# Patient Record
Sex: FEMALE | Race: WHITE | Employment: UNEMPLOYED | URBAN - METROPOLITAN AREA
[De-identification: names, ages, dates, MRNs, and addresses within clinical notes are randomized per-mention and may not be internally consistent; named-entity substitution may affect disease eponyms.]

---

## 2017-03-23 ENCOUNTER — HOSPITAL ENCOUNTER (OUTPATIENT)
Dept: MAMMOGRAPHY | Age: 52
Discharge: HOME | End: 2017-03-23
Payer: COMMERCIAL

## 2017-03-23 DIAGNOSIS — Z12.31 ENCOUNTER FOR SCREENING MAMMOGRAM FOR BREAST CANCER: ICD-10-CM

## 2017-03-23 PROCEDURE — 77063 BREAST TOMOSYNTHESIS BI: CPT

## 2018-03-15 ENCOUNTER — HOSPITAL ENCOUNTER (OUTPATIENT)
Dept: MAMMOGRAPHY | Age: 53
Discharge: HOME | End: 2018-03-17
Payer: COMMERCIAL

## 2018-03-15 DIAGNOSIS — Z12.31 VISIT FOR SCREENING MAMMOGRAM: ICD-10-CM

## 2018-03-15 PROCEDURE — 77063 BREAST TOMOSYNTHESIS BI: CPT

## 2019-03-26 ENCOUNTER — HOSPITAL ENCOUNTER (OUTPATIENT)
Dept: MAMMOGRAPHY | Age: 54
Discharge: HOME | End: 2019-03-28
Payer: COMMERCIAL

## 2019-03-26 DIAGNOSIS — Z12.31 VISIT FOR SCREENING MAMMOGRAM: ICD-10-CM

## 2019-03-26 PROCEDURE — 77063 BREAST TOMOSYNTHESIS BI: CPT

## 2020-06-04 ENCOUNTER — HOSPITAL ENCOUNTER (OUTPATIENT)
Dept: MAMMOGRAPHY | Age: 55
Discharge: HOME | End: 2020-06-06
Payer: COMMERCIAL

## 2020-06-04 PROCEDURE — 77063 BREAST TOMOSYNTHESIS BI: CPT

## 2021-07-26 ENCOUNTER — HOSPITAL ENCOUNTER (OUTPATIENT)
Dept: MAMMOGRAPHY | Age: 56
Discharge: HOME | End: 2021-07-28
Payer: COMMERCIAL

## 2021-07-26 DIAGNOSIS — Z12.31 ENCOUNTER FOR SCREENING MAMMOGRAM FOR MALIGNANT NEOPLASM OF BREAST: ICD-10-CM

## 2021-07-26 PROCEDURE — 77063 BREAST TOMOSYNTHESIS BI: CPT

## 2022-07-27 ENCOUNTER — HOSPITAL ENCOUNTER (OUTPATIENT)
Dept: MAMMOGRAPHY | Age: 57
Discharge: HOME | End: 2022-07-29
Payer: COMMERCIAL

## 2022-07-27 DIAGNOSIS — Z12.31 VISIT FOR SCREENING MAMMOGRAM: ICD-10-CM

## 2022-07-27 PROCEDURE — 77063 BREAST TOMOSYNTHESIS BI: CPT

## 2023-05-27 ENCOUNTER — HOSPITAL ENCOUNTER (EMERGENCY)
Age: 58
Discharge: HOME | End: 2023-05-27
Attending: EMERGENCY MEDICINE
Payer: COMMERCIAL

## 2023-05-27 ENCOUNTER — APPOINTMENT (OUTPATIENT)
Dept: GENERAL RADIOLOGY | Age: 58
End: 2023-05-27
Payer: COMMERCIAL

## 2023-05-27 VITALS
DIASTOLIC BLOOD PRESSURE: 53 MMHG | SYSTOLIC BLOOD PRESSURE: 118 MMHG | HEIGHT: 63 IN | OXYGEN SATURATION: 100 % | HEART RATE: 65 BPM | WEIGHT: 132 LBS | TEMPERATURE: 98.7 F | RESPIRATION RATE: 16 BRPM | BODY MASS INDEX: 23.39 KG/M2

## 2023-05-27 DIAGNOSIS — S82.045A CLOSED NONDISPLACED COMMINUTED FRACTURE OF LEFT PATELLA, INITIAL ENCOUNTER: Primary | ICD-10-CM

## 2023-05-27 DIAGNOSIS — S51.011A LACERATION OF RIGHT ELBOW, INITIAL ENCOUNTER: ICD-10-CM

## 2023-05-27 PROCEDURE — 73080 X-RAY EXAM OF ELBOW: CPT

## 2023-05-27 PROCEDURE — 99283 EMERGENCY DEPT VISIT LOW MDM: CPT

## 2023-05-27 PROCEDURE — 73562 X-RAY EXAM OF KNEE 3: CPT

## 2023-05-27 PROCEDURE — 73130 X-RAY EXAM OF HAND: CPT

## 2023-05-27 PROCEDURE — 12001 RPR S/N/AX/GEN/TRNK 2.5CM/<: CPT

## 2023-05-27 RX ORDER — ZINC GLUCONATE 50 MG
50 TABLET ORAL DAILY
COMMUNITY

## 2023-05-27 RX ORDER — LANOLIN ALCOHOL/MO/W.PET/CERES
1 CREAM (GRAM) TOPICAL 2 TIMES DAILY
COMMUNITY

## 2023-05-27 RX ORDER — ASCORBIC ACID 100 MG
TABLET,CHEWABLE ORAL
COMMUNITY

## 2023-05-27 RX ORDER — ALENDRONATE SODIUM 35 MG/1
35 TABLET ORAL
COMMUNITY

## 2023-05-27 RX ORDER — PYRAZINAMIDE 500 MG/1
1 TABLET ORAL EVERY 4 HOURS PRN
Qty: 18 TABLET | Refills: 0 | Status: SHIPPED | OUTPATIENT
Start: 2023-05-27 | End: 2023-06-01

## 2023-05-27 RX ORDER — CALCIUM CARBONATE/VITAMIN D3 500 MG-10
TABLET,CHEWABLE ORAL
COMMUNITY

## 2023-05-27 RX ORDER — SODIUM CAPRYLATE
POWDER (GRAM) MISCELLANEOUS
COMMUNITY

## 2023-05-27 ASSESSMENT — PAIN - FUNCTIONAL ASSESSMENT: PAIN_FUNCTIONAL_ASSESSMENT: 0-10

## 2023-05-27 ASSESSMENT — PAIN SCALES - GENERAL: PAINLEVEL_OUTOF10: 6

## 2023-05-27 NOTE — ED NOTES
Patient was walking dog, tripped on sidewalk and fell to the sidewalk, c/o left sided facial pain, left knee pain, left middle finger pain and has an abrasion on right elbow.       Joseph Cruz., RN  [id number]/[id number]

## 2023-05-27 NOTE — ED PROVIDER NOTES
EMERGENCY DEPARTMENT ENCOUNTER    Pt Name: Brunilda Gomez  MRN: [id number]  [place] 1965  Date of evaluation: 5/27/23  CHIEF COMPLAINT       Chief Complaint   Patient presents with    Fall     Walking dog. Missed raised sidewalk and fell    Knee Pain     Left     Finger Pain     Left middle finger     Abrasion     Right elbow     Facial Pain     Right side      HISTORY OF PRESENT ILLNESS   61-year-old female presents emergency room for fall. Patient was walking her dog and tripped. She has an abrasion to her left knee some pain to her left middle finger and an abrasion to her right elbow. She did not hit her head but did scrape her face a little. No loss of consciousness. Patient experience some dizziness after getting up and into the car after the injury. Patient has osteoporosis is very concerned about fracture. REVIEW OF SYSTEMS     Review of Systems   All other systems reviewed and are negative. PASTMEDICAL HISTORY     Past Medical History:   Diagnosis Date    Osteoporosis      Past Problem List  There is no problem list on file for this patient. SURGICAL HISTORY       Past Surgical History:   Procedure Laterality Date    ANTERIOR CRUCIATE LIGAMENT REPAIR       CURRENT MEDICATIONS       Previous Medications    ALENDRONATE (FOSAMAX) 35 MG TABLET    Take 1 tablet by mouth every 7 days    ASCORBIC ACID (VITAMIN C) 100 MG CHEW    Take by mouth    CALCIUM CARB-CHOLECALCIFEROL 500-10 MG-MCG CHEW    Calcium 500 + D (D3)   take one three times a day    CALCIUM CITRATE-VITAMIN D (CITRACAL+D) 315-5 MG-MCG TABS PER TABLET    Take 1 tablet by mouth 2 times daily    SODIUM CAPRYLATE POWD    Take by mouth    ZINC GLUCONATE 50 MG TABLET    Take 1 tablet by mouth daily     ALLERGIES     is allergic to latex. FAMILY HISTORY     has no family status information on file.       SOCIAL HISTORY       Social History     Tobacco Use    Smoking status: Never    Smokeless tobacco: Never   Substance Use Topics

## 2023-05-30 ENCOUNTER — OFFICE VISIT (OUTPATIENT)
Dept: ORTHOPEDIC SURGERY | Age: 58
End: 2023-05-30
Payer: COMMERCIAL

## 2023-05-30 VITALS — RESPIRATION RATE: 16 BRPM | OXYGEN SATURATION: 100 % | BODY MASS INDEX: 23.39 KG/M2 | WEIGHT: 132 LBS | HEIGHT: 63 IN

## 2023-05-30 DIAGNOSIS — S82.045A CLOSED NONDISPLACED COMMINUTED FRACTURE OF LEFT PATELLA, INITIAL ENCOUNTER: Primary | ICD-10-CM

## 2023-05-30 PROCEDURE — 99204 OFFICE O/P NEW MOD 45 MIN: CPT | Performed by: ORTHOPAEDIC SURGERY

## 2023-05-30 RX ORDER — ASPIRIN 325 MG
325 TABLET, DELAYED RELEASE (ENTERIC COATED) ORAL DAILY
Qty: 42 TABLET | Refills: 0 | Status: SHIPPED | OUTPATIENT
Start: 2023-05-30 | End: 2023-07-11

## 2023-05-30 NOTE — PROGRESS NOTES
[address] [place] [address] AND SPORTS MEDICINE  Critical access hospital Amy [facility]ovacs  [address] [place] [address]  Dept: [phone]    Ambulatory Orthopedic Consult      CHIEF COMPLAINT:    Chief Complaint   Patient presents with    Knee Pain     Left        HISTORY OF PRESENT ILLNESS:      The patient is a 59 y.o. female who is being seen for evaluation of the above, which began 5/27/2023 secondary to a fall from a curb  . At today's visit, she is using a knee immobilizer. History is obtained today from:   [x]  the patient     [x]  EMR     []  one family member/friend    []  multiple family members/friends    []  other:          REVIEW OF SYSTEMS:  Musculoskeletal: See HPI for pertinent positives     Past Medical History:    She  has a past medical history of Osteoporosis. Past Surgical History:    She  has a past surgical history that includes Anterior cruciate ligament repair. Current Medications:     Current Outpatient Medications:     Calcium Carb-Cholecalciferol 500-10 MG-MCG CHEW, Calcium 500 + D (D3)  take one three times a day, Disp: , Rfl:     Ascorbic Acid (VITAMIN C) 100 MG CHEW, Take by mouth, Disp: , Rfl:     Sodium Caprylate POWD, Take by mouth, Disp: , Rfl:     zinc gluconate 50 MG tablet, Take 1 tablet by mouth daily, Disp: , Rfl:     alendronate (FOSAMAX) 35 MG tablet, Take 1 tablet by mouth every 7 days, Disp: , Rfl:     calcium citrate-vitamin D (CITRACAL+D) 315-5 MG-MCG TABS per tablet, Take 1 tablet by mouth 2 times daily, Disp: , Rfl:     acetaminophen-codeine (TYLENOL/CODEINE #3) 300-30 MG per tablet, Take 1 tablet by mouth every 4 hours as needed for Pain for up to 5 days. Intended supply: 3 days. Take lowest dose possible to manage pain Max Daily Amount: 6 tablets, Disp: 18 tablet, Rfl: 0     Allergies:    Latex    Family History:  family history is not on file.     Social History:   Social History     Occupational History    Not on

## 2023-06-08 DIAGNOSIS — S82.045A CLOSED NONDISPLACED COMMINUTED FRACTURE OF LEFT PATELLA, INITIAL ENCOUNTER: Primary | ICD-10-CM

## 2023-06-15 ENCOUNTER — OFFICE VISIT (OUTPATIENT)
Dept: ORTHOPEDIC SURGERY | Age: 58
End: 2023-06-15
Payer: COMMERCIAL

## 2023-06-15 VITALS — RESPIRATION RATE: 16 BRPM | WEIGHT: 132 LBS | BODY MASS INDEX: 23.39 KG/M2 | OXYGEN SATURATION: 100 % | HEIGHT: 63 IN

## 2023-06-15 DIAGNOSIS — S82.045D CLOSED NONDISPLACED COMMINUTED FRACTURE OF LEFT PATELLA WITH ROUTINE HEALING, SUBSEQUENT ENCOUNTER: Primary | ICD-10-CM

## 2023-06-15 PROCEDURE — 99212 OFFICE O/P EST SF 10 MIN: CPT | Performed by: ORTHOPAEDIC SURGERY

## 2023-06-20 ENCOUNTER — HOSPITAL ENCOUNTER (OUTPATIENT)
Dept: PHYSICAL THERAPY | Facility: CLINIC | Age: 58
Setting detail: THERAPIES SERIES
Discharge: HOME | End: 2023-06-20
Attending: ORTHOPAEDIC SURGERY
Payer: COMMERCIAL

## 2023-06-20 PROCEDURE — 97530 THERAPEUTIC ACTIVITIES: CPT

## 2023-06-20 PROCEDURE — 97161 PT EVAL LOW COMPLEX 20 MIN: CPT

## 2023-06-20 PROCEDURE — 97110 THERAPEUTIC EXERCISES: CPT

## 2023-06-20 PROCEDURE — 97140 MANUAL THERAPY 1/> REGIONS: CPT

## 2023-06-20 PROCEDURE — 97016 VASOPNEUMATIC DEVICE THERAPY: CPT

## 2023-06-20 NOTE — CONSULTS
[x] [facility]  Outpatient Rehabilitation &  Therapy  [facility]   Jayashree Kleinon: [phone]  F: [phone]      Physical Therapy Lower Extremity Evaluation    Date:  2023  Patient: Naomi Gilford   : 1965  MRN: [id number]  Physician: Dr. Stephens Congo: Zander Herrera after eval)  Medical Diagnosis: Closed nondisplaced comminuted fracture of left patella with routine healing, subsequent encounter (S82.045D [ICD-10-CM])    Rehab Codes: M25.562, M25.662, M79.662, M25.462  Onset date: 23   Next 's appt.: 23    Subjective:   CC/HPI: Patient is a 58 y/o female presenting with L knee pain after a fall while walking her dog on 23. X-rays confirmed a patellar fracture with a possible displacement. Patient is currently in a T ROM knee immobilizer, locking the brace in extension with weight bearing. She is questioning the fit of the  on arrival. Per last note from referring provider, patient to begin 0 to 30 degrees range of motion starting on 23. She reports that pain is overall upder control, does have some pain with stair navigation. PMHx: [] Unremarkable [] Diabetes [] HTN  [] Pacemaker   [] MI/Heart Problems [] Cancer [] Arthritis [] Other:              [x] Refer to full medical chart  In EPIC      Past Medical History          Diagnosis Date Comments     Osteoporosis [M81.0]               Past Surgical History         Laterality Date Comments   Anterior cruciate ligament repair [[id number]]                 Comorbidities:   [x] Obesity [] Dialysis  [] N/A   [] Asthma/COPD [] Dementia [] Other:   [] Stroke [] Sleep apnea [] Other:   [] Vascular disease [] Rheumatic disease [] Other:     Tests:   [x] X-ray:  6/15/2023 FINDINGS:  3 weightbearing views (AP, Lateral, and Sunrise) of the left knee were obtained in the office today and reviewed, revealing transverse patella fracture with questionable interval subtle displacement.      IMPRESSION:

## 2023-06-26 ENCOUNTER — APPOINTMENT (OUTPATIENT)
Dept: PHYSICAL THERAPY | Facility: CLINIC | Age: 58
End: 2023-06-26
Attending: ORTHOPAEDIC SURGERY
Payer: COMMERCIAL

## 2023-06-28 ENCOUNTER — HOSPITAL ENCOUNTER (OUTPATIENT)
Dept: PHYSICAL THERAPY | Facility: CLINIC | Age: 58
Setting detail: THERAPIES SERIES
Discharge: HOME | End: 2023-06-28
Attending: ORTHOPAEDIC SURGERY
Payer: COMMERCIAL

## 2023-06-28 PROCEDURE — 97530 THERAPEUTIC ACTIVITIES: CPT

## 2023-06-28 PROCEDURE — 97110 THERAPEUTIC EXERCISES: CPT

## 2023-06-28 PROCEDURE — 97016 VASOPNEUMATIC DEVICE THERAPY: CPT

## 2023-06-30 ENCOUNTER — HOSPITAL ENCOUNTER (OUTPATIENT)
Dept: PHYSICAL THERAPY | Facility: CLINIC | Age: 58
Setting detail: THERAPIES SERIES
Discharge: HOME | End: 2023-06-30
Attending: ORTHOPAEDIC SURGERY
Payer: COMMERCIAL

## 2023-06-30 PROCEDURE — 97016 VASOPNEUMATIC DEVICE THERAPY: CPT

## 2023-06-30 PROCEDURE — 97110 THERAPEUTIC EXERCISES: CPT

## 2023-07-03 ENCOUNTER — HOSPITAL ENCOUNTER (OUTPATIENT)
Dept: PHYSICAL THERAPY | Facility: CLINIC | Age: 58
Setting detail: THERAPIES SERIES
Discharge: HOME | End: 2023-07-03
Attending: ORTHOPAEDIC SURGERY
Payer: COMMERCIAL

## 2023-07-03 PROCEDURE — 97016 VASOPNEUMATIC DEVICE THERAPY: CPT

## 2023-07-03 PROCEDURE — 97110 THERAPEUTIC EXERCISES: CPT

## 2023-07-03 NOTE — FLOWSHEET NOTE
[x] [facility]  Outpatient Rehabilitation &  Therapy  [address]   [address]   [place]: [phone]  F: [phone]      Physical Therapy Daily Treatment Note    Date:  7/3/2023  Patient Name:  Saranya Del Rio    :  1965  MRN: [id number]  Physician: Dr. Ez Liratle: Zander (20 vs, Andria Patient after 20)  Medical Diagnosis: Closed nondisplaced comminuted fracture of left patella with routine healing, subsequent encounter (S82.045D [ICD-10-CM])                  Rehab Codes: M25.562, M25.662, M79.662, M25.462  Onset date: 23                           Next Dr's appt.: 23  Visit# / total visits:     Cancels/No Shows: 0    Subjective:    Pain:  [x] Yes  [] No Location: LLE knee  Pain Rating: (0-10 scale) 2/10  Pain altered Tx:  [x] No  [] Yes  Action:  Comments: Patient reports today with 2/10 pain in the knee at arrival.     Objective:  Precautions: on 23 begin 0 to 30 degrees range of motion, adding 15 degrees of motion every week as tolerated, with the goal of 90 degrees by 8 weeks. Okay to begin isometric strengthening. She may continue weightbearing as tolerated in the T ROM knee immobilizer, locked in extension. She will sleep in the brace.     Exercises:  Exercise      LLE Patellar fracture   Reps/ Time Weight/ Level Comments              NuStep 10'  L1 0-30 range only              Calf SB Stretch  3x30\"   HEP- long sitting    Hamstring belt Stretch  3x30\"   HEP- long sitting             Quad Set - long sitting  20x5\"   HEP   Quad isometrics at 10 degrees knee extension- manual resistance 20x5\"   Towel roll under knee   Heel Slides  x20   0-30 degrees only    Hamstring Set   10x5\"                Sidelying hip abduction   x20                4-way hip band   x15 [place]  bilat   Band TKE   10x10\" Orange      Balance board   L2x3'                  Other: Stair navigation x1 with use of the left sided railing and cane     Gameready:

## 2023-07-06 ENCOUNTER — HOSPITAL ENCOUNTER (OUTPATIENT)
Dept: PHYSICAL THERAPY | Facility: CLINIC | Age: 58
Setting detail: THERAPIES SERIES
Discharge: HOME | End: 2023-07-06
Attending: ORTHOPAEDIC SURGERY
Payer: COMMERCIAL

## 2023-07-06 PROCEDURE — 97110 THERAPEUTIC EXERCISES: CPT

## 2023-07-06 PROCEDURE — 97140 MANUAL THERAPY 1/> REGIONS: CPT

## 2023-07-06 PROCEDURE — 97016 VASOPNEUMATIC DEVICE THERAPY: CPT

## 2023-07-06 NOTE — FLOWSHEET NOTE
[x] [facility]  Outpatient Rehabilitation &  Therapy  [address]   [address]   [place]: [phone]  F: [phone]      Physical Therapy Daily Treatment Note    Date:  2023  Patient Name:  Dez Garcia    :  1965  MRN: [id number]  Physician: Dr. Townsend Deal: [employer] (20 vs, Richad Delfin after 20)  Medical Diagnosis: Closed nondisplaced comminuted fracture of left patella with routine healing, subsequent encounter (S82.045D [ICD-10-CM])                  Rehab Codes: M25.562, M25.662, M79.662, M25.462  Onset date: 23                           Next 's appt.: 23  Visit# / total visits:     Cancels/No Shows: 0        Subjective:    Pain:  [x] Yes  [] No Location: LLE knee  Pain Rating: (0-10 scale) 0/10  Pain altered Tx:  [x] No  [] Yes  Action:  Comments: Patient reports today with no pain at arrival.       Objective:  Precautions: on 23 begin 0 to 30 degrees range of motion, adding 15 degrees of motion every week as tolerated, with the goal of 90 degrees by 8 weeks. Okay to begin isometric strengthening. She may continue weightbearing as tolerated in the T ROM knee immobilizer, locked in extension. She will sleep in the brace.       Exercise      LLE Patellar fracture   Reps/ Time Weight/ Level Comments              NuStep 10'  L1 0-30 range only              Calf SB Stretch  3x30\"   HEP- long sitting    Hamstring belt Stretch  3x30\"   HEP- long sitting             Quad Set - long sitting  20x5\"   HEP   Quad isometrics at 10 degrees knee extension- manual resistance 20x5\"   Towel roll under knee   Heel Slides  x20   0-30 degrees only    Hamstring Set   10x5\"                Sidelying hip abduction   x20                4-way hip band   x15 Green  bilat   Band TKE   20x10\" Green      Balance board   L2x3'                   Gameready: Vasocompression x15 min to the Bilat knee mod compression      Specific Instructions for next

## 2023-07-13 DIAGNOSIS — S82.045A CLOSED NONDISPLACED COMMINUTED FRACTURE OF LEFT PATELLA, INITIAL ENCOUNTER: Primary | ICD-10-CM

## 2023-07-20 ENCOUNTER — OFFICE VISIT (OUTPATIENT)
Dept: ORTHOPEDIC SURGERY | Age: 58
End: 2023-07-20
Payer: COMMERCIAL

## 2023-07-20 ENCOUNTER — HOSPITAL ENCOUNTER (OUTPATIENT)
Dept: PHYSICAL THERAPY | Facility: CLINIC | Age: 58
Setting detail: THERAPIES SERIES
Discharge: HOME | End: 2023-07-20
Attending: ORTHOPAEDIC SURGERY
Payer: COMMERCIAL

## 2023-07-20 VITALS — RESPIRATION RATE: 16 BRPM | HEIGHT: 63 IN | OXYGEN SATURATION: 100 % | WEIGHT: 132 LBS | BODY MASS INDEX: 23.39 KG/M2

## 2023-07-20 DIAGNOSIS — S82.045D CLOSED NONDISPLACED COMMINUTED FRACTURE OF LEFT PATELLA WITH ROUTINE HEALING, SUBSEQUENT ENCOUNTER: Primary | ICD-10-CM

## 2023-07-20 PROCEDURE — 99212 OFFICE O/P EST SF 10 MIN: CPT | Performed by: ORTHOPAEDIC SURGERY

## 2023-07-20 PROCEDURE — 97016 VASOPNEUMATIC DEVICE THERAPY: CPT

## 2023-07-20 PROCEDURE — 97110 THERAPEUTIC EXERCISES: CPT

## 2023-07-20 NOTE — PROGRESS NOTES
displacement of her fracture, check for extensor lag, and likely continue to progress her activity. At the patient's next visit, depending on how the patient is doing and/or new imaging/labs results, we may consider the following options:    []  Orthotic (OTC)     []  Orthotic (custom)          []  Rocker bottom shoes     []  Brace (OTC)        []  Brace (custom)             []  CAM boot        []  Night splint         []  Heel cups        []  Strap      []  Toe sleeves/splints    []  PT:                     []  Wean out of immobilization   []  Advance activity       []  Topical               []  NSAIDs          []  Lupe         []  Referral:         []  Stress xrays       []  CT         []  MRI        []  Injection:         []  Consider OR      []  Pick OR date    No follow-ups on file. No orders of the defined types were placed in this encounter. No orders of the defined types were placed in this encounter. Bong Garnett MD  Orthopedic Surgery        Please excuse any typos/errors, as this note was created with the assistance of voice recognition software. While intending to generate a document that actually reflects the content of the visit, the document can still have some errors including those of syntax and sound-a-like substitutions which may escape proof reading. In such instances, actual meaning can be extrapolated by context.

## 2023-07-20 NOTE — FLOWSHEET NOTE
risk prevention  []  []  []        []  []  []      Date Addressed:            LTG: To be met in 20 treatments           1. Improve score on assessment tool Lower Extremity Functional Scale (LEFS) from 90% impairment to less than 50% impairment  []  []  []      2. Reduce pain levels to 0/10 or less with ADLs without use of the brace []  []  []      3. Patient to demonstrate normal gait without use of AD or additional knee bracing to return to normal ADL's []  []  []                                  Patient goals: pain relief, walk normal      Pt. Education:  [x] Yes  [x] No  [x] Reviewed Prior HEP/Ed, discussed form   Method of Education: [x] Verbal  [x] Demo  [x] Written    Access Code: [email or web address]  URL: [employer]/  Date: 06/28/2023  Prepared by: Rosemary Ferraro    Exercises  - Long Sitting Ankle Plantar Flexion with Resistance  - 1 x daily - 7 x weekly - 2 sets - 10 reps  - Supine Heel Slide with Strap  - 2 x daily - 7 x weekly - 2 sets - 10 reps     Access Code: [email or web address]  URL: [email or web address]  Date: 06/20/2023  Prepared by: Rosemary Ferraro     Exercises  - Supine Ankle Pumps  - 2-3 x daily - 7 x weekly - 3 sets - 10 reps  - Long Sitting Quad Set  - 2-3 x daily - 7 x weekly - 3 sets - 10 reps - 10 (sec) hold  - Long Sitting Calf Stretch with Strap  - 2-3 x daily - 7 x weekly - 3 sets - 30 (sec) hold  - Seated Table Hamstring Stretch  - 2-3 x daily - 7 x weekly - 3 sets - 30 (sec) hold       Access Code: [email or web address]  URL: [email or web address]  Date: 07/06/2023  Prepared by: Fortino Fierro    Exercises  - Standing Hip Abduction with Anchored Resistance  - 1 x daily - 7 x weekly - 3 sets - 10 reps  - Standing Hip Extension with Anchored Resistance  - 1 x daily - 7 x weekly - 3 sets - 10 reps  - Standing Hip Adduction with Anchored Resistance  - 1 x daily - 7 x weekly - 3 sets - 10 reps  - Standing Repeated Hip Flexion with Resistance  - 1 x daily - 7 x weekly - 3 sets - 10 reps  -

## 2023-07-25 ENCOUNTER — HOSPITAL ENCOUNTER (OUTPATIENT)
Dept: PHYSICAL THERAPY | Facility: CLINIC | Age: 58
Setting detail: THERAPIES SERIES
Discharge: HOME | End: 2023-07-25
Attending: ORTHOPAEDIC SURGERY
Payer: COMMERCIAL

## 2023-07-25 PROCEDURE — 97016 VASOPNEUMATIC DEVICE THERAPY: CPT

## 2023-07-25 PROCEDURE — 97110 THERAPEUTIC EXERCISES: CPT

## 2023-07-25 NOTE — FLOWSHEET NOTE
[x] [facility]  Outpatient Rehabilitation &  Therapy  [address]   [address]   [place]: [phone]  F: [phone]      Physical Therapy Daily Treatment Note    Date:  2023  Patient Name:  Selene Kiser    :  1965  MRN: [id number]  Physician: Dr. Cierra Palmer: Zander (20 vs, [employer] after 20)  Medical Diagnosis: Closed nondisplaced comminuted fracture of left patella with routine healing, subsequent encounter (S82.045D [ICD-10-CM])                  Rehab Codes: M25.562, M25.662, M79.662, M25.462  Onset date: 23                           Next Dr's appt.: 23  Visit# / total visits:     Cancels/No Shows: 0      Subjective:    Pain:  [x] Yes  [] No Location: LLE knee  Pain Rating: (0-10 scale) 0/10  Pain altered Tx:  [x] No  [] Yes  Action:  Comments: Patient has been sleeping with the brace intermittently, afraid she is going to wake up in the middle of the night and accidentally walk on her leg without the brace.        Objective:  Precautions: L knee ROM as tolerated, WBAT with T ROM brace donned   Exercise      LLE Patellar fracture    Onset 23 Reps/ Time Weight/ Level Comments              NuStep 5'  L2 0-90 range only             Calf SB Stretch  3x30\"      Hamstring belt Stretch  3x30\"               Quad Set - long sitting  20x10\"   HEP   LAQ x10      Heel Slides x20   0-90 degrees only    Hamstring Set                  Sidelying hip abduction   x20 Orange     Clamshells  x20 [place]           4-way hip band   x20 Green  Bilat, knee unlocked    Band TKE   10x10\" Green      Balance board         Total Gym Squat L15x20  ROM as tolerated    Heel raises off edge of step  x20       Gait training without AD 2x10 feet discussed heel to toe transition with knee flexion throughout swing phase as tolerated     Gameready: Vasocompression x10 min to the Bilat knee mod compression      Specific Instructions for next treatment:

## 2023-07-27 ENCOUNTER — HOSPITAL ENCOUNTER (OUTPATIENT)
Dept: PHYSICAL THERAPY | Facility: CLINIC | Age: 58
Setting detail: THERAPIES SERIES
Discharge: HOME | End: 2023-07-27
Attending: ORTHOPAEDIC SURGERY
Payer: COMMERCIAL

## 2023-07-27 PROCEDURE — 97110 THERAPEUTIC EXERCISES: CPT

## 2023-07-27 PROCEDURE — 97016 VASOPNEUMATIC DEVICE THERAPY: CPT

## 2023-07-27 NOTE — FLOWSHEET NOTE
Education:  [x] Verbalizes understanding. [x] Demonstrates understanding. [x] Needs review. [] Demonstrates/verbalizes HEP/Ed previously given. Plan: [x] Continue current frequency toward long and short term goals.     [x] Specific Instructions for subsequent treatments: see above         Time In: 1500             Time Out: 0270      Electronically signed by:  Steve Miles PT

## 2023-08-01 ENCOUNTER — HOSPITAL ENCOUNTER (OUTPATIENT)
Dept: PHYSICAL THERAPY | Facility: CLINIC | Age: 58
Setting detail: THERAPIES SERIES
Discharge: HOME | End: 2023-08-01
Attending: ORTHOPAEDIC SURGERY
Payer: COMMERCIAL

## 2023-08-01 PROCEDURE — 97016 VASOPNEUMATIC DEVICE THERAPY: CPT

## 2023-08-01 PROCEDURE — 97110 THERAPEUTIC EXERCISES: CPT

## 2023-08-01 NOTE — FLOWSHEET NOTE
from 90% impairment to less than 50% impairment  []  []  []      2. Reduce pain levels to 0/10 or less with ADLs without use of the brace []  []  []      3. Patient to demonstrate normal gait without use of AD or additional knee bracing to return to normal ADL's []  []  []                                  Patient goals: pain relief, walk normal      Pt. Education:  [x] Yes  [x] No  [x] Reviewed Prior HEP/Ed, discussed form and new exercises   Method of Education: [x] Verbal  [x] Demo  [] Written    Access Code: [email or web address]  URL: [email or web address]  Date: 08/01/2023  Prepared by: Stephanie Gillespie    Exercises  - Supine Bridge  - 1 x daily - 7 x weekly - 3 sets - 10 reps  - Prone Knee Flexion  - 1 x daily - 7 x weekly - 3 sets - 10 reps      Access Code: [email or web address]  URL: [employer]/  Date: 07/25/2023  Prepared by: Stephanie Gillespie    Exercises  - Clamshell with Resistance  - 1 x daily - 7 x weekly - 2 sets - 10 reps  - Sidelying Hip Abduction with Resistance at Thighs  - 1 x daily - 7 x weekly - 2 sets - 10 reps  - Seated Long Arc Quad  - 1 x daily - 7 x weekly - 3 sets - 10 reps  - Supine Active Straight Leg Raise  - 1 x daily - 7 x weekly - 2 sets - 10 reps      Access Code: [email or web address]  URL: [employer]/  Date: 06/28/2023  Prepared by: Stephanie Gillespie    Exercises  - Long Sitting Ankle Plantar Flexion with Resistance  - 1 x daily - 7 x weekly - 2 sets - 10 reps  - Supine Heel Slide with Strap  - 2 x daily - 7 x weekly - 2 sets - 10 reps     Access Code: [email or web address]  URL: [employer]/  Date: 06/20/2023  Prepared by: Stephanie Grills     Exercises  - Supine Ankle Pumps  - 2-3 x daily - 7 x weekly - 3 sets - 10 reps  - Long Sitting Quad Set  - 2-3 x daily - 7 x weekly - 3 sets - 10 reps - 10 (sec) hold  - Long Sitting Calf Stretch with Strap  - 2-3 x daily - 7 x weekly - 3 sets - 30 (sec) hold  - Seated Table Hamstring Stretch  - 2-3 x daily - 7 x weekly - 3

## 2023-08-03 ENCOUNTER — HOSPITAL ENCOUNTER (OUTPATIENT)
Dept: PHYSICAL THERAPY | Facility: CLINIC | Age: 58
Setting detail: THERAPIES SERIES
Discharge: HOME | End: 2023-08-03
Attending: ORTHOPAEDIC SURGERY
Payer: COMMERCIAL

## 2023-08-03 PROCEDURE — 97016 VASOPNEUMATIC DEVICE THERAPY: CPT

## 2023-08-03 PROCEDURE — 97110 THERAPEUTIC EXERCISES: CPT

## 2023-08-03 NOTE — FLOWSHEET NOTE
[x] [facility]  Outpatient Rehabilitation &  Therapy  [address]   [address]   [place]: [phone]  F: [phone]      Physical Therapy Daily Treatment Note    Date:  8/3/2023  Patient Name:  Kobe Menezes    :  1965  MRN: [id number]  Physician: Dr. Schmidt Captain: Zander (20 vs, Jaspreet Jay after 20)  Medical Diagnosis: Closed nondisplaced comminuted fracture of left patella with routine healing, subsequent encounter (S82.045D [ICD-10-CM])                  Rehab Codes: M25.562, M25.662, M79.662, M25.462  Onset date: 23                           Next 's appt.: 23  Visit# / total visits: 10/20    Cancels/No Shows: 0      Subjective:    Pain:  [x] Yes  [] No Location: LLE knee  Pain Rating: (0-10 scale) 0/10  Pain altered Tx:  [x] No  [] Yes  Action:  Comments: Patient arrived noting continued improvements in function.        Objective:  Precautions: LLE knee ROM as tolerated, WBAT with T ROM brace donned , begin to wean out of brace 23  Exercise      LLE Patellar fracture    Onset 23 Reps/ Time Weight/ Level Comments Completed 23                NuStep 5'  L2 0-90 range only     Bike  5'   Grey bike, lowest level   Charged while obtaining hx and for ROM  x   Calf SB Stretch  3x30\"    x   Hamstring Stretch at step 3x30\"    x             LAQ x10    x   Heel Slides x20   Within tolerance  x   SLR  x20     x             Sidelying hip abduction   x20 Blue    x   Clamshells  x20 Blue    x   Prone knee flexion   x20 0# Added 23 x   Step down   2x10 6\" Increased step 23 x   Step up   2x10 6\"  x   Heel taps  2x10 2\"     4-way hip band   x15 Blue   Bilat, knee unlocked   Progressed resis 23 x   Band TKE   10x10\" Green       Balance board   L2x5'     x   Total Gym Squat  L20x20  ROM as tolerated  x   Total Gym Heel Raises   L20x20   x   Heel raises off edge of step   x20        Stool scoots    Next       Gameready:

## 2023-08-08 ENCOUNTER — HOSPITAL ENCOUNTER (OUTPATIENT)
Dept: PHYSICAL THERAPY | Facility: CLINIC | Age: 58
Setting detail: THERAPIES SERIES
Discharge: HOME | End: 2023-08-08
Attending: ORTHOPAEDIC SURGERY
Payer: COMMERCIAL

## 2023-08-08 PROCEDURE — 97110 THERAPEUTIC EXERCISES: CPT

## 2023-08-08 NOTE — PROGRESS NOTES
[x] [facility]  Outpatient Rehabilitation &  Therapy  [address]   [address]   [place]: [phone]  F: [phone]      Physical Therapy Daily Treatment Note    Date:  2023  Patient Name:  Rose Churchill    :  1965  MRN: [id number]  Physician: Dr. Pauline Fleming: Zander (20 vs, Karlos Diaz after 20)  Medical Diagnosis: Closed nondisplaced comminuted fracture of left patella with routine healing, subsequent encounter (S82.045D [ICD-10-CM])                  Rehab Codes: M25.562, M25.662, M79.662, M25.462  Onset date: 23                           Next Dr's appt.: 23  Visit# / total visits:     Cancels/No Shows: 0      Subjective:    Pain:  [x] Yes  [] No Location: LLE knee  Pain Rating: (0-10 scale) 0/10  Pain altered Tx:  [x] No  [] Yes  Action:  Comments: Patient arrived noting continued improvements in function. She is improving her ability to complete steps at home. Has had a few instances of knee instability with gait when wearing her brace, no pain associated.        Objective:  Precautions: LLE knee ROM as tolerated, WBAT with T ROM brace donned , begin to wean out of brace 23  Exercise      LLE Patellar fracture    Onset 23 Reps/ Time Weight/ Level Comments Completed 23                NuStep 5'  L2 0-90 range only     Bike  5'   Grey bike, lowest level   Charged 7 min while obtaining history  x   Calf SB Stretch  3x30\"    x   Hamstring Stretch at step 3x30\"    x             LAQ x10       Heel Slides x20   Within tolerance     SLR  x20                  Sidelying hip abduction   x20 Blue       Clamshells  x20 Blue       Prone knee flexion   x20 0# Added 23 x   Prone hip extension knee straight  x20  Added 23 x   Step down   2x10 6\" Increased step 23 x   Step up   2x10 6\"  x   Heel taps  x10 2\"  x   4-way hip band   x15 Blue   Bilat, knee unlocked   Progressed resis 23 x   Band TKE   10x10\" [facility]

## 2023-08-10 ENCOUNTER — HOSPITAL ENCOUNTER (OUTPATIENT)
Dept: PHYSICAL THERAPY | Facility: CLINIC | Age: 58
Setting detail: THERAPIES SERIES
Discharge: HOME | End: 2023-08-10
Attending: ORTHOPAEDIC SURGERY
Payer: COMMERCIAL

## 2023-08-10 PROCEDURE — 97110 THERAPEUTIC EXERCISES: CPT

## 2023-08-10 NOTE — PROGRESS NOTES
[x] [facility]  Outpatient Rehabilitation &  Therapy  [address]   [address]   [place]: [phone]  F: [phone]      Physical Therapy Daily Treatment and Progress Note    Date:  8/10/2023  Patient Name:  Yu Tom    :  1965  MRN: [id number]  Physician: Dr. Carlos Alberto Molina: Zander (20 vs, Jesi Finders after 20)  Medical Diagnosis: Closed nondisplaced comminuted fracture of left patella with routine healing, subsequent encounter (S82.045D [ICD-10-CM])                  Rehab Codes: M25.562, M25.662, M79.662, M25.462  Onset date: 23                           Next Dr's appt.: 23  Visit# / total visits:     Cancels/No Shows: 0      Subjective:    Pain:  [x] Yes  [] No Location: LLE knee  Pain Rating: (0-10 scale) 0/10  Pain altered Tx:  [x] No  [] Yes  Action:  Comments: Patient arrived noting continued improvements in function. She is improving her ability to complete steps at home. Has had a few instances of knee instability with gait when wearing her brace, no pain associated.        Objective:  Precautions: LLE knee ROM as tolerated, WBAT with T ROM brace donned , begin to wean out of brace 23  Exercise      LLE Patellar fracture    Onset 23 Reps/ Time Weight/ Level Comments                   Bike  10'   Grey bike, lowest level   Charged 7 min while obtaining history          Calf SB Stretch  3x30\"      Hamstring Stretch at step 3x30\"      Prone hip ext x20       Prone knee flexion  x20 0#    Prone knee hang 2' 2# weight      Step down   2x10 6\"    Step up   2x10 6\"    Heel taps  2x10 4\"    4-way hip band   x15 Blue      Band TKE   10x10\" Green     Balance board   L2x5'      Squats to mat table   x20     Rebounder   x20     Heel raises off edge of step   x20      Stool scoots   x2   10#    Total Gym squats  x20 L20    Total Gym heel raises   x20 L20        Specific Instructions for next treatment: progress strength,

## 2023-08-15 ENCOUNTER — HOSPITAL ENCOUNTER (OUTPATIENT)
Dept: MAMMOGRAPHY | Age: 58
Discharge: HOME | End: 2023-08-17
Payer: COMMERCIAL

## 2023-08-15 ENCOUNTER — HOSPITAL ENCOUNTER (OUTPATIENT)
Dept: PHYSICAL THERAPY | Facility: CLINIC | Age: 58
Setting detail: THERAPIES SERIES
Discharge: HOME | End: 2023-08-15
Attending: ORTHOPAEDIC SURGERY
Payer: COMMERCIAL

## 2023-08-15 DIAGNOSIS — Z12.31 VISIT FOR SCREENING MAMMOGRAM: ICD-10-CM

## 2023-08-15 PROCEDURE — 97110 THERAPEUTIC EXERCISES: CPT

## 2023-08-15 PROCEDURE — 77063 BREAST TOMOSYNTHESIS BI: CPT

## 2023-08-15 NOTE — FLOWSHEET NOTE
[x] [facility]  Outpatient Rehabilitation &  Therapy  [address]   [address]   [place]: [phone]  F: [phone]      Physical Therapy Daily Treatment and Progress Note    Date:  8/15/2023  Patient Name:  Kobe Menezes    :  1965  MRN: [id number]  Physician: Dr. Schmidt Captain: Yazmin Wheat (8 visits approved from 23 to 23)  Medical Diagnosis: Closed nondisplaced comminuted fracture of left patella with routine healing, subsequent encounter (S82.045D [ICD-10-CM])                  Rehab Codes: M25.562, M25.662, M79.662, M25.462  Onset date: 23                           Next 's appt.: 23  Visit# / total visits:      Cancels/No Shows: 0      Subjective:    Pain:  [x] Yes  [] No Location: LLE knee  Pain Rating: (0-10 scale) 0/10  Pain altered Tx:  [x] No  [] Yes  Action:  Comments: Patient arrives with brace donned. No complaints of pain.        Objective:  Precautions: LLE knee ROM as tolerated, WBAT with T ROM brace donned , begin to wean out of brace 23  Exercise      LLE Patellar fracture    Onset 23 Reps/ Time Weight/ Level Comments                   Bike  10'   Grey bike, lowest level   Charged 3 min while obtaining history          Calf SB Stretch  3x30\"      Hamstring Stretch at step 3x30\"      Clamshells x25 Lime     Hip abduction  x20 Lime    Prone hip ext 2x10    Bend in table    Prone knee flexion  x20 0#    Prone knee hang 2' 2# weight      Prone quadriceps stretch  3x30\"     Step down   6\"    Step up   6\"    Heel taps  4\"    4-way hip band   x15 Blue      Band TKE       Balance board  5' L2    Squats to mat table   x20     Rebounder   x20     Heel raises off edge of step   NT       Stool scoots   x2   10#    Total Gym squats  NT L20    Monster Walks   1x20 ft Lime Band at knees  Forward and lateral today              Specific Instructions for next treatment: progress strength, begin single leg

## 2023-08-17 ENCOUNTER — APPOINTMENT (OUTPATIENT)
Dept: PHYSICAL THERAPY | Facility: CLINIC | Age: 58
End: 2023-08-17
Attending: ORTHOPAEDIC SURGERY
Payer: COMMERCIAL

## 2023-08-17 ENCOUNTER — HOSPITAL ENCOUNTER (OUTPATIENT)
Dept: PHYSICAL THERAPY | Facility: CLINIC | Age: 58
Setting detail: THERAPIES SERIES
Discharge: HOME | End: 2023-08-17
Attending: ORTHOPAEDIC SURGERY
Payer: COMMERCIAL

## 2023-08-17 PROCEDURE — 97110 THERAPEUTIC EXERCISES: CPT

## 2023-08-17 NOTE — FLOWSHEET NOTE
Treatment Charges: Mins Units   []  Modalities     [x]  Ther Exercise 40 3   []  Manual Therapy     []  Ther Activities     []  Neuro Re-ed     []  Vasocompression     [] Gait     []  Other     Total Treatment time 40 3       Assessment: [x] Progressing toward goals. Performed all exercises with brace doffed. Patient noted muscle fatigue and soreness throughout with no complaint of pain or instability. Reviewed prone program this date and issued handouts and reviewed weaning out of brace. Will plan to monitor response to progressions and continue to advance strength program per tolerance. [] No change. [] Other:  [x] Patient would continue to benefit from skilled physical therapy services in order to: : improve L knee ROM and strength, improve obvious edema, improve gait mechanics without an AD, and decrease pain to prepare her for her typical ADL's       Goals  MET NOT MET ON-  GOING  Details   Date Addressed: 8/8/23           STG: To be met in 10 treatments            1. ? Pain: Decrease L knee pain levels to 2/10 with ADLs []  [x]  []  3/10 pain at highest     2. ? ROM: Increase L knee AROM to at least 0-90 deg to reduce difficulty with ADL's [x]  []  []   0-122 degrees    3. ? Strength: Increase MMT to 5/5 throughout to ease functional limitations and mobility  []  [x]  []  Continued hip weakness, especially gluteals bilaterally   4. Patient to demonstrate a set of 20 SLR without quad lag suggesting improve quadriceps strength required for ADL's  [x]   []   []      5. Independent with Home Exercise Programs [x]  []  []      6. Demonstrate knowledge of fall risk prevention  []  []  []       []  []  []      Date Addressed: 8/8/23           LTG: To be met in 20 treatments           1.  Improve score on assessment tool Lower Extremity Functional Scale (LEFS) from 90% impairment to less than 50% impairment  [x]  []  []  38% impairment on Lower Extremity Functional Scale (LEFS)    2. Reduce pain levels to

## 2023-08-21 ENCOUNTER — HOSPITAL ENCOUNTER (OUTPATIENT)
Dept: PHYSICAL THERAPY | Facility: CLINIC | Age: 58
Setting detail: THERAPIES SERIES
Discharge: HOME | End: 2023-08-21
Attending: ORTHOPAEDIC SURGERY
Payer: COMMERCIAL

## 2023-08-21 PROCEDURE — 97110 THERAPEUTIC EXERCISES: CPT

## 2023-08-21 NOTE — FLOWSHEET NOTE
[x] [facility]  Outpatient Rehabilitation &  Therapy  [address]   [address]   [place]: [phone]  F: [phone]      Physical Therapy Daily Treatment and Progress Note    Date:  2023  Patient Name:  Patrick Levine    :  1965  MRN: [id number]  Physician: Dr. Judah Bermeo: [employer] (8 visits approved from 23 to 23)  Medical Diagnosis: Closed nondisplaced comminuted fracture of left patella with routine healing, subsequent encounter (S82.045D [ICD-10-CM])                  Rehab Codes: M25.562, M25.662, M79.662, M25.462  Onset date: 23                           Next 's appt.: 23  Visit# / total visits: 15/28     Cancels/No Shows: 0      Subjective:    Pain:  [x] Yes  [] No Location: LLE knee  Pain Rating: (0-10 scale) 0/10  Pain altered Tx:  [x] No  [] Yes  Action:  Comments: Patient arrived today with no complaints of pain.        Objective:  Precautions: LLE knee ROM as tolerated, WBAT with T ROM brace donned , begin to wean out of brace 23  Exercise      LLE Patellar fracture    Onset 23 Reps/ Time Weight/ Level Comments                   Bike  10'   Grey bike, lowest level          Calf SB Stretch  3x30\"      Hamstring Stretch at step 3x30\"      Prone hang             Lunges   2x10     Step down   2x10 6\"    Step up   2x10 6\"    Heel taps  2x10 4\"    4-way hip band   Tracky   5' L2    Squats to mat table   x20     Rebounder   x20  Red ball     Stool scoots   x2   10#    Total Gym squats  x20 L20    Monster Walks   x10 Forward and lateral steps    Cones x3 Bilat         Specific Instructions for next treatment: progress strength, begin single leg stability       Treatment Charges: Mins Units   []  Modalities     [x]  Ther Exercise 45 3   []  Manual Therapy     []  Ther Activities     []  Neuro Re-ed     []  Vasocompression     [] Gait     []  Other     Total Treatment time 45 3

## 2023-08-23 ENCOUNTER — HOSPITAL ENCOUNTER (OUTPATIENT)
Dept: PHYSICAL THERAPY | Facility: CLINIC | Age: 58
Setting detail: THERAPIES SERIES
Discharge: HOME | End: 2023-08-23
Attending: ORTHOPAEDIC SURGERY
Payer: COMMERCIAL

## 2023-08-23 PROCEDURE — 97110 THERAPEUTIC EXERCISES: CPT

## 2023-08-23 NOTE — FLOWSHEET NOTE
[x] [facility]  Outpatient Rehabilitation &  Therapy  [address]   [address]   [place]: [phone]  F: [phone]      Physical Therapy Daily Treatment and Progress Note    Date:  2023  Patient Name:  Selene Kiser    :  1965  MRN: [id number]  Physician: Dr. Cierra Palmer: Mesfin Rader (8 visits approved from 23 to 23)  Medical Diagnosis: Closed nondisplaced comminuted fracture of left patella with routine healing, subsequent encounter (S82.045D [ICD-10-CM])                  Rehab Codes: M25.562, M25.662, M79.662, M25.462  Onset date: 23                           Next 's appt.: 23  Visit# / total visits:      Cancels/No Shows: 0      Subjective:    Pain:  [x] Yes  [] No Location: LLE knee  Pain Rating: (0-10 scale) 0/10  Pain altered Tx:  [x] No  [] Yes  Action:  Comments: Patient arrived today with reports of hip soreness on the left following last session, minimal knee pain.        Objective:  Precautions: LLE WBAT   Exercise      LLE Patellar fracture    Onset 23 Reps/ Time Weight/ Level Comments                   Bike  10'   Grey bike, lowest level          Calf SB Stretch  3x30\"      Hamstring Stretch at step 3x30\"      Prone hang             Lunges   2x10     Step down    6\"    Step up   2x10 6\"    Heel taps  2x10 6\"    4-way hip band   [employer]   5' L2    Squats to mat table   x20     Rebounder 3-way   x20  Red ball  Added 3-way     Stool scoots     10#    Total Gym single leg squats 2x10 L20 Added    Monster Walks   x10 Forward and lateral steps    Cones x2 Bilat         Specific Instructions for next treatment: progress strength, begin single leg stability       Treatment Charges: Mins Units   []  Modalities     [x]  Ther Exercise 45 3   []  Manual Therapy     []  Ther Activities     []  Neuro Re-ed     []  Vasocompression     [] Gait     []  Other     Total Treatment time

## 2023-08-29 ENCOUNTER — HOSPITAL ENCOUNTER (OUTPATIENT)
Dept: PHYSICAL THERAPY | Facility: CLINIC | Age: 58
Setting detail: THERAPIES SERIES
Discharge: HOME | End: 2023-08-29
Attending: ORTHOPAEDIC SURGERY
Payer: COMMERCIAL

## 2023-08-29 PROCEDURE — 97110 THERAPEUTIC EXERCISES: CPT

## 2023-08-29 NOTE — FLOWSHEET NOTE
[x] [facility]  Outpatient Rehabilitation &  Therapy  [address]   [address]   [place]: [phone]  F: [phone]      Physical Therapy Daily Treatment and Progress Note    Date:  2023  Patient Name:  Noelle Castaneda    :  1965  MRN: [id number]  Physician: Dr. Luis Shaw: Petey Clark (8 visits approved from 23 to 23)  Medical Diagnosis: Closed nondisplaced comminuted fracture of left patella with routine healing, subsequent encounter (S82.045D [ICD-10-CM])                  Rehab Codes: M25.562, M25.662, M79.662, M25.462  Onset date: 23                           Next 's appt.: 23  Visit# / total visits:      Cancels/No Shows: 0      Subjective:    Pain:  [x] Yes  [] No Location: LLE knee  Pain Rating: (0-10 scale) 0/10  Pain altered Tx:  [x] No  [] Yes  Action:  Comments: Patient arrives without complaints. She had some pain to her anterior knee for a few days, it has since resolved. Has been walking 1-2 miles at a time around her neighborhood. She always walks with her  or daughter due to discomfort and some fear.      Objective:  Precautions: LLE WBAT   Exercise      LLE Patellar fracture    Onset 23 Reps/ Time Weight/ Level Comments                   Bike  10'   Grey bike, lowest level          Calf SB Stretch  3x30\"      Hamstring Stretch at step 3x30\"      Prone hang             Lunges   x10     Step down    6\"    Step up with march   2x10 6\" with foam    Heel taps  x12 6\"    4-way hip band   x15 Blue      Balance board   5' L2    TRX Squats  2x10     Rebounder 3-way   x20  Red ball  Added 3-way     Stool scoots   1x50ft  10#    Total Gym single leg squats 2x10 L20 Added    Monster Walks   X10  Forward and lateral steps Utilized band this date   Cones x2 Bilat         Specific Instructions for next treatment: elliptical warm up?, progress strength, begin single leg stability

## 2023-08-31 ENCOUNTER — HOSPITAL ENCOUNTER (OUTPATIENT)
Dept: PHYSICAL THERAPY | Facility: CLINIC | Age: 58
Setting detail: THERAPIES SERIES
Discharge: HOME | End: 2023-08-31
Attending: ORTHOPAEDIC SURGERY
Payer: COMMERCIAL

## 2023-08-31 PROCEDURE — 97110 THERAPEUTIC EXERCISES: CPT

## 2023-08-31 NOTE — FLOWSHEET NOTE
without use of the brace []  [x]  []      3. Patient to demonstrate normal gait without use of AD or additional knee bracing to return to normal ADL's []  [x]  []  Continues to use the knee brace per protocol, to remove for ambulation starting on 8/17/23                                Patient goals: pain relief, walk normal      Pt. Education:  [x] Yes  [x] No  [x] Reviewed Prior HEP/Ed, discussed form and proper technique   Method of Education: [x] Verbal  [x] Demo  [x] Written:  Access Code: [email or web address]  URL: [email or web address]  Date: 08/17/2023  Prepared by: Magde Priyank    Exercises  - Prone Hip Extension with Bent Knee  - 1 x daily - 7 x weekly - 2 sets - 10 reps  - Prone Knee Flexion AROM  - 1 x daily - 7 x weekly - 2 sets - 10 reps  - Prone Quadriceps Stretch with Strap  - 1 x daily - 7 x weekly - 3 sets - 10 reps - 30 hold    Comprehension of Education:  [x] Verbalizes understanding. [x] Demonstrates understanding. [x] Needs review. [] Demonstrates/verbalizes HEP/Ed previously given. Plan: [x] Continue current frequency toward long and short term goals.     [x] Specific Instructions for subsequent treatments: see above        Time In: 1350        Time Out: [facility]      Electronically signed by:  Yusef King, PT

## 2023-09-07 ENCOUNTER — HOSPITAL ENCOUNTER (OUTPATIENT)
Dept: PHYSICAL THERAPY | Facility: CLINIC | Age: 58
Setting detail: THERAPIES SERIES
Discharge: HOME | End: 2023-09-07
Attending: ORTHOPAEDIC SURGERY
Payer: COMMERCIAL

## 2023-09-07 PROCEDURE — 97110 THERAPEUTIC EXERCISES: CPT

## 2023-09-07 NOTE — FLOWSHEET NOTE
Patient goals: pain relief, walk normal      Pt. Education:  [x] Yes  [x] No  [x] Reviewed Prior HEP/Ed, discussed form and proper technique   Method of Education: [x] Verbal  [x] Demo  [x] Written:  Access Code: [email or web address]  URL: [email or web address]  Date: 08/17/2023  Prepared by: Maged Yost    Exercises  - Prone Hip Extension with Bent Knee  - 1 x daily - 7 x weekly - 2 sets - 10 reps  - Prone Knee Flexion AROM  - 1 x daily - 7 x weekly - 2 sets - 10 reps  - Prone Quadriceps Stretch with Strap  - 1 x daily - 7 x weekly - 3 sets - 10 reps - 30 hold    Comprehension of Education:  [x] Verbalizes understanding. [x] Demonstrates understanding. [x] Needs review. [] Demonstrates/verbalizes HEP/Ed previously given. Plan: [x] Continue current frequency toward long and short term goals.     [x] Specific Instructions for subsequent treatments: see above        Time In: 1350        Time Out: [address] [facility]      Electronically signed by:  Yusef King, PT

## 2023-09-14 ENCOUNTER — HOSPITAL ENCOUNTER (OUTPATIENT)
Dept: PHYSICAL THERAPY | Facility: CLINIC | Age: 58
Setting detail: THERAPIES SERIES
Discharge: HOME | End: 2023-09-14
Attending: ORTHOPAEDIC SURGERY
Payer: COMMERCIAL

## 2023-09-14 DIAGNOSIS — S82.045A CLOSED NONDISPLACED COMMINUTED FRACTURE OF LEFT PATELLA, INITIAL ENCOUNTER: Primary | ICD-10-CM

## 2023-09-14 DIAGNOSIS — S82.045D CLOSED NONDISPLACED COMMINUTED FRACTURE OF LEFT PATELLA WITH ROUTINE HEALING, SUBSEQUENT ENCOUNTER: ICD-10-CM

## 2023-09-14 PROCEDURE — 97110 THERAPEUTIC EXERCISES: CPT

## 2023-09-14 NOTE — FLOWSHEET NOTE
[x] [facility]  Outpatient Rehabilitation &  Therapy  [address]   [address]   [place]: [phone]  F: [phone]      Physical Therapy Daily Treatment and Progress Note    Date:  2023  Patient Name:  Solomon Alexandra    :  1965  MRN: [id number]  Physician: Dr. Chiu Sour: Dedrick Silva (8 visits approved from 23 to 23)  Medical Diagnosis: Closed nondisplaced comminuted fracture of left patella with routine healing, subsequent encounter (S82.045D [ICD-10-CM])                  Rehab Codes: M25.562, M25.662, M79.662, M25.462  Onset date: 23                           Next 's appt.: 23  Visit# / total visits:      Cancels/No Shows: 0      Subjective:    Pain:  [] Yes  [x] No Location: LLE knee  Pain Rating: (0-10 scale) 0/10  Pain altered Tx:  [x] No  [] Yes  Action:  Comments: Patient arrived noting no pain with no new issues to report.      Objective:  Precautions: LLE WBAT   Exercise      LLE Patellar fracture    Onset 23 Reps/ Time Weight/ Level Comments                   Bike  10'   Grey bike, lowest level          Calf slantboard Stretch  3x30\"      Hamstring Stretch at step 3x30\"      Prone hang             Lunges   x10     Step down    6\"    Step up with march   2x10 6\" with foam    Heel taps  x12 6\"    4-way hip band   x15 Blue      Balance board   5' L3    TRX Squats   2x10     Rebounder 3-way   x20  Red ball     Stool scoots   1x50ft  10#    Total Gym single leg squats  2x10 L20    Monster Walks   x10 Forward and lateral steps Clarke    Cones  x2 Bilat        Specific Instructions for next treatment: elliptical warm up, progress strength, begin single leg stability       Treatment Charges: Mins Units   []  Modalities     [x]  Ther Exercise 45 3   []  Manual Therapy     []  Ther Activities     []  Neuro Re-ed     []  Vasocompression     [] Gait     []  Other     Total Treatment time 45 3

## 2023-09-19 ENCOUNTER — OFFICE VISIT (OUTPATIENT)
Dept: ORTHOPEDIC SURGERY | Age: 58
End: 2023-09-19
Payer: COMMERCIAL

## 2023-09-19 ENCOUNTER — HOSPITAL ENCOUNTER (OUTPATIENT)
Dept: PHYSICAL THERAPY | Facility: CLINIC | Age: 58
Setting detail: THERAPIES SERIES
Discharge: HOME | End: 2023-09-19
Attending: ORTHOPAEDIC SURGERY
Payer: COMMERCIAL

## 2023-09-19 VITALS — RESPIRATION RATE: 16 BRPM | HEIGHT: 63 IN | OXYGEN SATURATION: 100 % | BODY MASS INDEX: 22.86 KG/M2 | WEIGHT: 129 LBS

## 2023-09-19 DIAGNOSIS — S82.045D CLOSED NONDISPLACED COMMINUTED FRACTURE OF LEFT PATELLA WITH ROUTINE HEALING, SUBSEQUENT ENCOUNTER: Primary | ICD-10-CM

## 2023-09-19 PROCEDURE — 97110 THERAPEUTIC EXERCISES: CPT

## 2023-09-19 PROCEDURE — 99212 OFFICE O/P EST SF 10 MIN: CPT | Performed by: ORTHOPAEDIC SURGERY

## 2023-09-19 NOTE — PROGRESS NOTES
[x] [facility]  Outpatient Rehabilitation &  Therapy  [address]   [address]   [place]: [phone]  F: [phone]      Physical Therapy Daily Treatment and Progress Note    Date:  2023  Patient Name:  Rose Churchill    :  1965  MRN: [id number]  Physician: Dr. Pauline Fleming: [address] [facility] (8 visits approved from 23 to 23)  Medical Diagnosis: Closed nondisplaced comminuted fracture of left patella with routine healing, subsequent encounter (S82.045D [ICD-10-CM])                  Rehab Codes: M25.562, M25.662, M79.662, M25.462  Onset date: 23                           Next Dr's appt.: 23  Visit# / total visits:      Cancels/No Shows: 0      Subjective:    Pain:  [] Yes  [x] No Location: LLE knee  Pain Rating: (0-10 scale) 0/10  Pain altered Tx:  [x] No  [] Yes  Action:  Comments: Patient arrived noting no pain with no new issues to report. She saw Dr. Madyson Klein prior to today's appointment. He is happy with her progress. Objective:  Precautions: LLE WBAT   Exercise      LLE Patellar fracture    Onset 23 Reps/ Time Weight/ Level Comments                   Bike  10'   Grey bike, lowest level          Calf slantboard Stretch  3x30\"      Hamstring Stretch at step 3x30\"      Prone hang             Lunges       Step down    6\"    Step up with \" with foam    Heel taps 2x10 6\"    4-way hip band   Blue      Balance board    L3    TRX Squats   2x10     Rebounder 3-way    Red ball     Stool scoots    10#    Total Gym single leg squats  L20    Monster Walks   2x20 ft ea Forward and lateral steps [place]    Cones  Bilat           Treatment Charges: Mins Units   []  Modalities     [x]  Ther Exercise 45 3   []  Manual Therapy     []  Ther Activities     []  Neuro Re-ed     []  Vasocompression     [] Gait     []  Other     Total Treatment time 45 3       Assessment: [x] Progressing toward goals.  Patient with good recall

## 2023-09-19 NOTE — PROGRESS NOTES
[facility]  [address] [facility] Laura Williamson  [address] [place] [address]  Dept: [phone]    Ambulatory Orthopedic Consult      CHIEF COMPLAINT:    Chief Complaint   Patient presents with    Knee Pain     Left        HISTORY OF PRESENT ILLNESS:      The patient is a 59 y.o. female who is being seen for evaluation of the above, which began 5/27/2023 secondary to a fall from a curb  . At today's visit, she is using a knee immobilizer. History is obtained today from:   [x]  the patient     [x]  EMR     []  one family member/friend    []  multiple family members/friends    []  other:      INTERVAL HISTORY 6/15/2023:  She is seen again today in the office for follow up of a previous issue (as above). Since being seen last, the patient is doing better. At today's visit, she is using a knee immobilizer with walker. History is obtained today from:   [x]  the patient     []  EMR     []  one family member/friend    []  multiple family members/friends    []  other:      INTERVAL HISTORY 7/20/2023:  She is seen again today in the office for follow up of a previous issue (as above). Since being seen last, the patient is doing better. At today's visit, she is using a removable brace and a cane. History is obtained today from:   [x]  the patient     []  EMR     []  one family member/friend    []  multiple family members/friends    []  other:      INTERVAL HISTORY 9/19/2023:  She is seen again today in the office for follow up of a previous issue (as above). Since being seen last, the patient is doing better. At today's visit, she is not using a brace or assistive device.     History is obtained today from:   [x]  the patient     []  EMR     []  one family member/friend    []  multiple family members/friends    []  other:        REVIEW OF SYSTEMS:  Musculoskeletal: See HPI for pertinent positives     Past Medical History:    She  has

## 2023-09-20 NOTE — DISCHARGE SUMMARY
[] [address]  [address] [facility] [place] [facility].  [phone]  F: [phone] [] [address] [facility]  [address]   [address]  P: [phone]  F: [phone] [] [address]  [address] [facility]  P: [phone]  F: [phone] [] [facility]: [phone]  F: [phone] [x] [address] [employer]  [address] [facility]   [address]   P: [phone]  F: [phone]  [] [address] [facility].   P: [phone]  F: [phone] [] [address] [facility]  [address]  P: [phone]  F: [phone] [] [address] [facility]  [address] [facility]  [place]: [phone]  F: [phone] [] [facility]  [place]: [phone]  F: [phone]      Physical Therapy Discharge Note    Date: 2023      Patient: Marnie Sandoval  : 1965  MRN: [id number]    Physician: Dr. Luciano Sutton: Laurie Engel (8 visits approved from 23 to 23)  Medical Diagnosis: Closed nondisplaced comminuted fracture of left patella with routine healing, subsequent encounter (S82.045D [ICD-10-CM])                  Rehab Codes: M25.562, M25.662, M79.662, M25.462  Onset date: 23                           Next 's appt.: 23  Visit# / total visits:                     Cancels/No Shows: 0  Date of initial visit: 23                Date of final visit: 23      Subjective:  Pain:  [] Yes  [x] No   Location: LLE knee     Pain Rating: (0-10 scale)

## 2024-08-19 ENCOUNTER — HOSPITAL ENCOUNTER (OUTPATIENT)
Dept: MAMMOGRAPHY | Age: 59
Discharge: HOME | End: 2024-08-21
Payer: COMMERCIAL

## 2024-08-19 VITALS — WEIGHT: 134 LBS | BODY MASS INDEX: 23.74 KG/M2

## 2024-08-19 DIAGNOSIS — Z12.31 ENCOUNTER FOR SCREENING MAMMOGRAM FOR BREAST CANCER: ICD-10-CM

## 2024-08-19 PROCEDURE — 77063 BREAST TOMOSYNTHESIS BI: CPT

## 2025-08-20 ENCOUNTER — HOSPITAL ENCOUNTER (OUTPATIENT)
Dept: MAMMOGRAPHY | Age: 60
Discharge: HOME | End: 2025-08-22
Payer: COMMERCIAL

## 2025-08-20 VITALS — HEIGHT: 63 IN | WEIGHT: 135 LBS | BODY MASS INDEX: 23.92 KG/M2

## 2025-08-20 DIAGNOSIS — Z12.31 VISIT FOR SCREENING MAMMOGRAM: ICD-10-CM

## 2025-08-20 PROCEDURE — 77063 BREAST TOMOSYNTHESIS BI: CPT
